# Patient Record
Sex: MALE | Race: WHITE
[De-identification: names, ages, dates, MRNs, and addresses within clinical notes are randomized per-mention and may not be internally consistent; named-entity substitution may affect disease eponyms.]

---

## 2017-12-07 ENCOUNTER — HOSPITAL ENCOUNTER (OUTPATIENT)
Dept: HOSPITAL 88 - PT | Age: 67
LOS: 24 days | End: 2017-12-31
Attending: ORTHOPAEDIC SURGERY
Payer: MEDICARE

## 2017-12-07 DIAGNOSIS — M16.11: ICD-10-CM

## 2017-12-07 DIAGNOSIS — Z96.641: Primary | ICD-10-CM

## 2017-12-07 DIAGNOSIS — M62.81: ICD-10-CM

## 2017-12-07 DIAGNOSIS — Z47.1: ICD-10-CM

## 2017-12-07 DIAGNOSIS — M25.651: ICD-10-CM

## 2017-12-07 PROCEDURE — 97110 THERAPEUTIC EXERCISES: CPT

## 2019-11-17 ENCOUNTER — HOSPITAL ENCOUNTER (OUTPATIENT)
Dept: HOSPITAL 88 - ER | Age: 69
Setting detail: OBSERVATION
LOS: 2 days | Discharge: HOME | End: 2019-11-19
Attending: INTERNAL MEDICINE | Admitting: INTERNAL MEDICINE
Payer: MEDICARE

## 2019-11-17 VITALS — BODY MASS INDEX: 24.67 KG/M2 | WEIGHT: 166.56 LBS | HEIGHT: 69 IN

## 2019-11-17 VITALS — SYSTOLIC BLOOD PRESSURE: 101 MMHG | DIASTOLIC BLOOD PRESSURE: 65 MMHG

## 2019-11-17 VITALS — SYSTOLIC BLOOD PRESSURE: 132 MMHG | DIASTOLIC BLOOD PRESSURE: 82 MMHG

## 2019-11-17 VITALS — DIASTOLIC BLOOD PRESSURE: 69 MMHG | SYSTOLIC BLOOD PRESSURE: 117 MMHG

## 2019-11-17 VITALS — DIASTOLIC BLOOD PRESSURE: 60 MMHG | SYSTOLIC BLOOD PRESSURE: 101 MMHG

## 2019-11-17 DIAGNOSIS — I10: ICD-10-CM

## 2019-11-17 DIAGNOSIS — E78.5: ICD-10-CM

## 2019-11-17 DIAGNOSIS — M54.2: ICD-10-CM

## 2019-11-17 DIAGNOSIS — Z82.49: ICD-10-CM

## 2019-11-17 DIAGNOSIS — R94.31: ICD-10-CM

## 2019-11-17 DIAGNOSIS — R07.2: Primary | ICD-10-CM

## 2019-11-17 DIAGNOSIS — K21.9: ICD-10-CM

## 2019-11-17 LAB
ALBUMIN SERPL-MCNC: 4 G/DL (ref 3.5–5)
ALBUMIN/GLOB SERPL: 1.7 {RATIO} (ref 0.8–2)
ALP SERPL-CCNC: 75 IU/L (ref 40–150)
ALT SERPL-CCNC: 25 IU/L (ref 0–55)
ANION GAP SERPL CALC-SCNC: 12.8 MMOL/L (ref 8–16)
BACTERIA URNS QL MICRO: (no result) /HPF
BASOPHILS # BLD AUTO: 0 10*3/UL (ref 0–0.1)
BASOPHILS NFR BLD AUTO: 0.6 % (ref 0–1)
BILIRUB UR QL: NEGATIVE
BUN SERPL-MCNC: 11 MG/DL (ref 7–26)
BUN/CREAT SERPL: 9 (ref 6–25)
CALCIUM SERPL-MCNC: 9.8 MG/DL (ref 8.4–10.2)
CHLORIDE SERPL-SCNC: 102 MMOL/L (ref 98–107)
CK MB SERPL-MCNC: 2 NG/ML (ref 0–5)
CK MB SERPL-MCNC: 2.4 NG/ML (ref 0–5)
CK MB SERPL-MCNC: 2.9 NG/ML (ref 0–5)
CK SERPL-CCNC: 110 IU/L (ref 30–200)
CK SERPL-CCNC: 81 IU/L (ref 30–200)
CK SERPL-CCNC: 91 IU/L (ref 30–200)
CLARITY UR: CLEAR
CO2 SERPL-SCNC: 28 MMOL/L (ref 22–29)
COLOR UR: YELLOW
DEPRECATED APTT PLAS QN: 27.3 SECONDS (ref 23.8–35.5)
DEPRECATED INR PLAS: 0.97
DEPRECATED NEUTROPHILS # BLD AUTO: 3.6 10*3/UL (ref 2.1–6.9)
DEPRECATED RBC URNS MANUAL-ACNC: (no result) /HPF (ref 0–5)
EGFRCR SERPLBLD CKD-EPI 2021: > 60 ML/MIN (ref 60–?)
EOSINOPHIL # BLD AUTO: 0.1 10*3/UL (ref 0–0.4)
EOSINOPHIL NFR BLD AUTO: 1.7 % (ref 0–6)
EPI CELLS URNS QL MICRO: (no result) /LPF
ERYTHROCYTE [DISTWIDTH] IN CORD BLOOD: 11.8 % (ref 11.7–14.4)
GLOBULIN PLAS-MCNC: 2.4 G/DL (ref 2.3–3.5)
GLUCOSE SERPLBLD-MCNC: 117 MG/DL (ref 74–118)
HCT VFR BLD AUTO: 46 % (ref 38.2–49.6)
HGB BLD-MCNC: 15.5 G/DL (ref 14–18)
KETONES UR QL STRIP.AUTO: NEGATIVE
LEUKOCYTE ESTERASE UR QL STRIP.AUTO: NEGATIVE
LYMPHOCYTES # BLD: 1.3 10*3/UL (ref 1–3.2)
LYMPHOCYTES NFR BLD AUTO: 23.7 % (ref 18–39.1)
MAGNESIUM SERPL-MCNC: 1.8 MG/DL (ref 1.3–2.1)
MCH RBC QN AUTO: 29.7 PG (ref 28–32)
MCHC RBC AUTO-ENTMCNC: 33.7 G/DL (ref 31–35)
MCV RBC AUTO: 88.1 FL (ref 81–99)
MONOCYTES # BLD AUTO: 0.4 10*3/UL (ref 0.2–0.8)
MONOCYTES NFR BLD AUTO: 7.4 % (ref 4.4–11.3)
NEUTS SEG NFR BLD AUTO: 66.4 % (ref 38.7–80)
NITRITE UR QL STRIP.AUTO: NEGATIVE
PLATELET # BLD AUTO: 221 X10E3/UL (ref 140–360)
POTASSIUM SERPL-SCNC: 3.8 MMOL/L (ref 3.5–5.1)
PROT UR QL STRIP.AUTO: NEGATIVE
PROTHROMBIN TIME: 13.4 SECONDS (ref 11.9–14.5)
RBC # BLD AUTO: 5.22 X10E6/UL (ref 4.3–5.7)
SODIUM SERPL-SCNC: 139 MMOL/L (ref 136–145)
SP GR UR STRIP: 1.01 (ref 1.01–1.02)
UROBILINOGEN UR STRIP-MCNC: 0.2 MG/DL (ref 0.2–1)
WBC #/AREA URNS HPF: (no result) /HPF (ref 0–5)

## 2019-11-17 PROCEDURE — 81001 URINALYSIS AUTO W/SCOPE: CPT

## 2019-11-17 PROCEDURE — 93017 CV STRESS TEST TRACING ONLY: CPT

## 2019-11-17 PROCEDURE — 82553 CREATINE MB FRACTION: CPT

## 2019-11-17 PROCEDURE — 83735 ASSAY OF MAGNESIUM: CPT

## 2019-11-17 PROCEDURE — 80061 LIPID PANEL: CPT

## 2019-11-17 PROCEDURE — 82550 ASSAY OF CK (CPK): CPT

## 2019-11-17 PROCEDURE — 36415 COLL VENOUS BLD VENIPUNCTURE: CPT

## 2019-11-17 PROCEDURE — 85025 COMPLETE CBC W/AUTO DIFF WBC: CPT

## 2019-11-17 PROCEDURE — 78452 HT MUSCLE IMAGE SPECT MULT: CPT

## 2019-11-17 PROCEDURE — 93005 ELECTROCARDIOGRAM TRACING: CPT

## 2019-11-17 PROCEDURE — 99284 EMERGENCY DEPT VISIT MOD MDM: CPT

## 2019-11-17 PROCEDURE — 85730 THROMBOPLASTIN TIME PARTIAL: CPT

## 2019-11-17 PROCEDURE — 84484 ASSAY OF TROPONIN QUANT: CPT

## 2019-11-17 PROCEDURE — 71045 X-RAY EXAM CHEST 1 VIEW: CPT

## 2019-11-17 PROCEDURE — 80053 COMPREHEN METABOLIC PANEL: CPT

## 2019-11-17 PROCEDURE — 85610 PROTHROMBIN TIME: CPT

## 2019-11-17 PROCEDURE — 83880 ASSAY OF NATRIURETIC PEPTIDE: CPT

## 2019-11-17 RX ADMIN — FAMOTIDINE SCH MG: 10 INJECTION, SOLUTION INTRAVENOUS at 21:08

## 2019-11-17 RX ADMIN — ASPIRIN SCH MG: 81 TABLET, COATED ORAL at 09:22

## 2019-11-17 RX ADMIN — FAMOTIDINE SCH MG: 10 INJECTION, SOLUTION INTRAVENOUS at 09:22

## 2019-11-17 NOTE — NUR
RECEIVED PT IN BED AOX3 .RESPIRATIONS ARE EVEN AND UNLABORED .DENIES PAIN /TELE SHOWS IST 
DEGREE BLOCK .CALL LIGHT WITH IN REACH  CONTINUE TO MONITOR

## 2019-11-17 NOTE — NUR
MD IN TO SEE PATIENT. DIET ADVANCED TO GI SOFT.

-------------------------------------------------------------------------------

Addendum: 11/17/19 at 1543 by Maritza Robles RN

-------------------------------------------------------------------------------

WRONG PATIENT.

## 2019-11-17 NOTE — XMS REPORT
Patient Summary Document

                             Created on: 2019



LENA TOMAS

External Reference #: [id number]

: 1950

Sex: Male



Demographics







                          Address                   [address] [place]  [address]

 

                          Home Phone                [phone]

 

                          Preferred Language        Unknown

 

                          Marital Status            Unknown

 

                          [employer] Affiliation     Unknown

 

                          Race                      Unknown

 

                          Ethnic Group              Unknown





Author







                          Author                    [facility]

 

                          [facility]

 

                          Address                   Unknown

 

                          Phone                     Unavailable







Support







                Name            Relationship    Address         Phone

 

                    JOAQUÍN HAYWOOD      PRS                 [employer]

[place], [place]  [address] [phone]

 

                    YOB[place]  [address]                      [phone]

 

                    LIVIERSANJANA HAYNESA      PRS                 N/A

[place]  [address]                      [phone]







Care Team Providers







                    Care Team Member Name    Role                Phone

 

                          Unavailable               Unavailable







Payers







             Payer Name    Policy Type    Policy Number    Effective Date    Expiration Date







Problems

This patient has no known problems.



Allergies, Adverse Reactions, Alerts







          Allergy Name    Allergy Type    Status    Severity    Reaction(s)    Onset Date    Inactive 

Date                      Treating Clinician        Comments

 

        No Known Allergies    DA      Active    U               2017 00:00:00                     







Medications

This patient has no known medications.

## 2019-11-17 NOTE — NUR
PATIENT ADMITTED FROM ER PER STRETCHER. ALERT AND VERBALLY RESPONSIVE. DENIED ANY PAIN AT 
THIS TIME. SKIN WARM AND DRY TO TOUCH, RESPIRATION EVEN AND UNLABORED, ABDOMEN SOFT AND NON 
DISTENDED. TELEMETRY BOX 7 IN PLACE. PATIENT ORIENTED TO SURROUNDINGS. BED IN LOWER 
POSITION, CALL LIGHT AT REACH, INSTRUCTED TO CALL FOR ASSISTANCE AS NEEDED.

## 2019-11-17 NOTE — DIAGNOSTIC IMAGING REPORT
Examination: Single AP view of the chest.



COMPARISON: Report for CT angiography of the chest 7/31/2017. No images

available.



INDICATION: Chest pain

     

DISCUSSION:



Lungs are well-inflated and without consolidation, pleural effusion, or

pneumothorax. Cardiomediastinal contour is notable for tortuosity of the

thoracic aorta. Normal heart size. No overt pulmonary edema.



No acute osseous abnormality.



IMPRESSION:

 

1.   No acute cardiopulmonary abnormalities.



Signed by: Dr. Chetan Hsu M.D. on 11/17/2019 9:00 AM

## 2019-11-18 VITALS — DIASTOLIC BLOOD PRESSURE: 64 MMHG | SYSTOLIC BLOOD PRESSURE: 107 MMHG

## 2019-11-18 VITALS — SYSTOLIC BLOOD PRESSURE: 120 MMHG | DIASTOLIC BLOOD PRESSURE: 78 MMHG

## 2019-11-18 VITALS — DIASTOLIC BLOOD PRESSURE: 59 MMHG | SYSTOLIC BLOOD PRESSURE: 119 MMHG

## 2019-11-18 VITALS — SYSTOLIC BLOOD PRESSURE: 114 MMHG | DIASTOLIC BLOOD PRESSURE: 60 MMHG

## 2019-11-18 VITALS — DIASTOLIC BLOOD PRESSURE: 74 MMHG | SYSTOLIC BLOOD PRESSURE: 112 MMHG

## 2019-11-18 VITALS — DIASTOLIC BLOOD PRESSURE: 82 MMHG | SYSTOLIC BLOOD PRESSURE: 130 MMHG

## 2019-11-18 LAB
ALBUMIN SERPL-MCNC: 3.5 G/DL (ref 3.5–5)
ALBUMIN/GLOB SERPL: 1.5 {RATIO} (ref 0.8–2)
ALP SERPL-CCNC: 66 IU/L (ref 40–150)
ALT SERPL-CCNC: 23 IU/L (ref 0–55)
ANION GAP SERPL CALC-SCNC: 10.1 MMOL/L (ref 8–16)
BASOPHILS # BLD AUTO: 0 10*3/UL (ref 0–0.1)
BASOPHILS NFR BLD AUTO: 0.7 % (ref 0–1)
BUN SERPL-MCNC: 12 MG/DL (ref 7–26)
BUN/CREAT SERPL: 12 (ref 6–25)
CALCIUM SERPL-MCNC: 9.5 MG/DL (ref 8.4–10.2)
CHLORIDE SERPL-SCNC: 104 MMOL/L (ref 98–107)
CHOLEST SERPL-MCNC: 126 MD/DL (ref 0–199)
CHOLEST/HDLC SERPL: 3.4 {RATIO} (ref 3.9–4.7)
CK MB SERPL-MCNC: 1.7 NG/ML (ref 0–5)
CK SERPL-CCNC: 78 IU/L (ref 30–200)
CO2 SERPL-SCNC: 28 MMOL/L (ref 22–29)
DEPRECATED NEUTROPHILS # BLD AUTO: 3.5 10*3/UL (ref 2.1–6.9)
EGFRCR SERPLBLD CKD-EPI 2021: > 60 ML/MIN (ref 60–?)
EOSINOPHIL # BLD AUTO: 0.1 10*3/UL (ref 0–0.4)
EOSINOPHIL NFR BLD AUTO: 2.3 % (ref 0–6)
ERYTHROCYTE [DISTWIDTH] IN CORD BLOOD: 11.9 % (ref 11.7–14.4)
GLOBULIN PLAS-MCNC: 2.3 G/DL (ref 2.3–3.5)
GLUCOSE SERPLBLD-MCNC: 94 MG/DL (ref 74–118)
HCT VFR BLD AUTO: 44.5 % (ref 38.2–49.6)
HDLC SERPL-MSCNC: 37 MG/DL (ref 40–60)
HGB BLD-MCNC: 14.8 G/DL (ref 14–18)
LDLC SERPL CALC-MCNC: 74 MG/DL (ref 60–130)
LYMPHOCYTES # BLD: 1.4 10*3/UL (ref 1–3.2)
LYMPHOCYTES NFR BLD AUTO: 25.6 % (ref 18–39.1)
MCH RBC QN AUTO: 29.2 PG (ref 28–32)
MCHC RBC AUTO-ENTMCNC: 33.3 G/DL (ref 31–35)
MCV RBC AUTO: 87.8 FL (ref 81–99)
MONOCYTES # BLD AUTO: 0.5 10*3/UL (ref 0.2–0.8)
MONOCYTES NFR BLD AUTO: 8.4 % (ref 4.4–11.3)
NEUTS SEG NFR BLD AUTO: 63 % (ref 38.7–80)
PLATELET # BLD AUTO: 185 X10E3/UL (ref 140–360)
POTASSIUM SERPL-SCNC: 4.1 MMOL/L (ref 3.5–5.1)
RBC # BLD AUTO: 5.07 X10E6/UL (ref 4.3–5.7)
SODIUM SERPL-SCNC: 138 MMOL/L (ref 136–145)
TRIGL SERPL-MCNC: 76 MG/DL (ref 0–149)

## 2019-11-18 RX ADMIN — ENALAPRIL MALEATE SCH MG: 10 TABLET ORAL at 09:00

## 2019-11-18 RX ADMIN — FAMOTIDINE SCH MG: 10 INJECTION, SOLUTION INTRAVENOUS at 08:10

## 2019-11-18 RX ADMIN — BICTEGRAVIR SODIUM, EMTRICITABINE, AND TENOFOVIR ALAFENAMIDE FUMARATE SCH: 50; 200; 25 TABLET ORAL at 09:00

## 2019-11-18 RX ADMIN — PANTOPRAZOLE SODIUM SCH MG: 40 TABLET, DELAYED RELEASE ORAL at 09:00

## 2019-11-18 NOTE — NUR
PT C/O CHEST PAIN CALLED DR DODSON AND GOT THE ORDER TO GIVE MORPHINE .GIVEN MORPHINE X1 .PT 
RESTING .PT NPO FOR STRESS TEST .CONTINUE TO MONITOR

## 2019-11-18 NOTE — HISTORY AND PHYSICAL
PRIMARY CARE PHYSICIAN:  Dr. Kali Mckeon.

 

CONSULTANT:  Dr. Angel Gay.

 

CHIEF COMPLAINT:  Chest pain.

 

HISTORY OF PRESENT ILLNESS:  The patient is a 69-year-old male, patient of Dr. Kali Mckeon.  The patient came to the hospital with complaint of atypical chest pain.  The

patient is supposed to have a stress test later on with Dr. Gay.  The patient is

stating that the chest pain described as burning, located to the left epigastric area.

There is no significant nausea or vomiting.  No diaphoresis.  The patient is stable.  He

is pending for a stress test today. 

 

PAST MEDICAL HISTORY:  Hypertension, dyslipidemia, reflux, gastritis. 

 

Hiatal hernia.

 

PAST SURGICAL HISTORY:  Colonoscopy, upper endoscopy and hip surgery. 

 

Abdominal hernia repair.

 

SOCIAL HISTORY:  The patient does not smoke or use alcohol.  No regular drugs.

 

ALLERGIES:  NO KNOWN ALLERGIES.

 

HOME MEDICATIONS:  List is reviewed.

 

REVIEW OF SYSTEMS:

Chest pain.  No headaches.  No nausea or vomiting.  No radiating pain.

 

PHYSICAL EXAMINATION:

VITAL SIGNS:  Temperature is 97, blood pressure 107/64, pulse rate 57, and respirations

18. 

GENERAL:  The patient is not in acute distress. 

HEENT:  Normocephalic and atraumatic.  Pupils reactive.  Anicteric. 

NECK:  Supple grossly. 

PULMONARY:  Clear. 

CARDIOVASCULAR:  Regular rate and rhythm. 

ABDOMEN:  Soft.  Unremarkable. 

EXTREMITIES:  No cyanosis or edema. 

NEUROLOGIC:  No gross focal deficit.

LABORATORY DATA:  Sodium is 138, potassium 4.1, chloride 104, bicarb 28, BUN 12,

creatinine 1.2, and glucose is 94. 

 

WBC is 5.6, hemoglobin 14.8, hematocrit 44.5, and platelets is 187. 

 

Chest x-ray otherwise unremarkable.

 

IMPRESSION:  Atypical chest pain.

 

PLAN:  Lexiscan stress test today.  Once the stress is negative, the patient should be

able to go home.  He will resume his home medication. 

 

 

 

 

______________________________

MD JAYCE Maria/ROSA

D:  11/18/2019 08:43:09

T:  11/18/2019 12:23:46

Job #:  [id number]

## 2019-11-18 NOTE — NUR
Attempted to call Dr. Escobar to report lexiscan stress test result but no answer. Awaiting on 
MD call back. Will pass on information to incoming dayshift RN.

## 2019-11-18 NOTE — NUR
Patient visited in room during nursing rounds. Patient alert and oriented x3. Ambulatory in 
room prn. Patient denies pain at this time. Awaiting on cardio (Dr. Green) results from 
stress test. Call bell within reach.

## 2019-11-18 NOTE — MYOVIEW STRESS TEST
DATE OF STUDY:  11/18/2019 09:09:00

 

Stress Test - Treadmill ONLY

 

PROCEDURE TITLE:  Rest/stress single isotope SPECT imaging pharmacologic stress and

gated SPECT imaging. 

 

INDICATION:  Chest pain.

 

PROCEDURE IN DETAIL:  Pharmacologic stress testing was performed with regadenoson per

protocol.  The heart rate was 60 beats per minute at rest and increased to 105 beats per

minute during the regadenoson infusion.  The rest blood pressure was 119/70 mmHg and

increased to 130/77 mmHg, which is a normal response.  The resting electrocardiogram

demonstrated normal sinus rhythm.  There were no ST-segment changes suggestive of

myocardial ischemia. 

 

Myocardial perfusion imaging was performed at rest following injection of 11 mCi of

tetrofosmin.  At peak pharmacologic effect, the patient was at 32 mCi of tetrofosmin.

Gated post-stress tomographic imaging was performed. 

 

FINDINGS:  The overall quality of study is fair.  Left ventricular cavity is normal size

on the rest and stress studies.  SPECT images demonstrate homogeneous tracer

distribution throughout the myocardium.  Gated SPECT imaging reveals normal myocardial

thickening and wall motion.  The left ventricular ejection fraction calculated to be

65%. 

 

IMPRESSION:  Myocardial perfusion imaging is normal.  Overall left ventricular systolic

function was normal without regional wall motion abnormalities. 

 

 

 

 

______________________________

Ericka Green MD

 

ABS/MODL

D:  11/18/2019 19:05:39

T:  11/18/2019 21:28:14

Job #:  [id number]

## 2019-11-18 NOTE — NUR
Received call from Dr. Green. MD stated lexiscan stress test was negative and pt cleared 
from cardiac standpoint.

## 2019-11-18 NOTE — NUR
Informed patient that stress test result was negative. Pt wishes to go home tomorrow. Will 
attempt to call Dr. Escobar to notify.

## 2019-11-18 NOTE — CONSULTATION
DATE OF CONSULTATION:  11/18/2019

 

Cardiology consultation 

 

REQUESTING PHYSICIAN:  Jesus Escobar MD.

 

REASON FOR CONSULTATION:  Chest pain.

 

HISTORY OF PRESENT ILLNESS:  This is a 69-year-old male with history of hypertension,

hyperlipidemia, who presents with complaints of chest pain.  He reports he has been

having chest pain for the last three or four days.  He describes it as a burning

sensation 4/10 in severity, lasting 45 minutes to 1 hour at a time when he lies down.

Denies any shortness of breath, nausea, diaphoresis.  Denies any edema, orthopnea or

PND. 

 

REVIEW OF SYSTEMS:

Negative except as per HPI.

 

PAST MEDICAL HISTORY:  

1. Hypertension.

2. Hyperlipidemia.

 

PAST SURGICAL HISTORY:  

1. Hernia repair.

2. Right hip replacement.

3. Toe surgery.

 

ALLERGIES:  PLEASE SEE EMR.

 

MEDICATIONS:  Please see medication list.

 

SOCIAL HISTORY:  Denies any tobacco or illicit drugs.  He does drink approximately a six

pack of beer a day. 

 

FAMILY HISTORY:  Pertinent for father and brother with coronary artery disease as well

as myocardial infarction. 

 

PHYSICAL EXAMINATION:

VITAL SIGNS: Temperature 96.4 degrees, pulse is 57, respiratory rate 18, blood pressure

107/64, and oxygen saturation 98% on room air. 

GENERAL:  Awake, alert, no acute distress. 

LUNGS:  Clear to auscultation bilaterally.  No wheezes or crackles. 

CARDIOVASCULAR:  Normal rate, regular rhythm.  No murmur.  Normal S1, S2. 

ABDOMEN:  Soft nontender. 

EXTREMITIES:  No edema. 

NEUROLOGIC:  Nonfocal exam.

LABORATORY DATA:  WBC 5.62, hemoglobin 14.8, hematocrit 44.5, platelets 185.  Sodium

138, potassium 4.1, chloride 104, CO2 of 28, BUN 12, creatinine 1, LDL 74. 

 

TELEMETRY:  Normal sinus rhythm.

 

ELECTROCARDIOGRAM:  EKG; normal sinus rhythm with sinus arrhythmia, T-wave abnormality,

anterior ischemia. 

 

IMPRESSION:  

1. Chest pain.

2. Hypertension.

3. Hyperlipidemia.

4. Abnormal EKG.

 

RECOMMENDATIONS:  The patient has ruled out for myocardial infarction with serial

cardiac biomarkers.  Obtain echo, nuclear stress test to evaluate for ischemia given his

risk factors.  Further recommendations pending test results. 

 

Thank you for this consult.  We will continue to follow.

 

 

 

 

______________________________

Ericka Green MD

 

ABS/MODL

D:  11/18/2019 17:21:22

T:  11/18/2019 18:30:59

Job #:  [id number]

## 2019-11-19 VITALS — DIASTOLIC BLOOD PRESSURE: 80 MMHG | SYSTOLIC BLOOD PRESSURE: 164 MMHG

## 2019-11-19 VITALS — DIASTOLIC BLOOD PRESSURE: 70 MMHG | SYSTOLIC BLOOD PRESSURE: 128 MMHG

## 2019-11-19 VITALS — SYSTOLIC BLOOD PRESSURE: 164 MMHG | DIASTOLIC BLOOD PRESSURE: 80 MMHG

## 2019-11-19 VITALS — DIASTOLIC BLOOD PRESSURE: 62 MMHG | SYSTOLIC BLOOD PRESSURE: 121 MMHG

## 2019-11-19 RX ADMIN — BICTEGRAVIR SODIUM, EMTRICITABINE, AND TENOFOVIR ALAFENAMIDE FUMARATE SCH: 50; 200; 25 TABLET ORAL at 09:00

## 2019-11-19 RX ADMIN — PANTOPRAZOLE SODIUM SCH MG: 40 TABLET, DELAYED RELEASE ORAL at 09:07

## 2019-11-19 RX ADMIN — ENALAPRIL MALEATE SCH MG: 10 TABLET ORAL at 09:07

## 2019-11-19 RX ADMIN — ASPIRIN SCH MG: 81 TABLET, COATED ORAL at 09:07

## 2019-11-19 NOTE — NUR
patient discharged home, Dr Escobar and Dr Green had rounds, prescription given, patient aware 
about his f/up appointments, IV canula removed with tip intact, no ss of infiltration, 
denies any pain, no distress noted, transported via wheelchair to [facility], he got his 
personnel car

## 2019-11-19 NOTE — PROGRESS NOTE
DATE:  11/19/2019

 

__________ 

 

SUBJECTIVE:  The patient denies chest pain or shortness of breath.  He complains of left

neck pain. 

 

OBJECTIVE:  VITAL SIGNS:  Temperature 97.9 degrees, pulse 82, respiratory rate 18, blood

164/80, and oxygen saturation 100% on room air. 

GENERAL:  Awake, alert, in no acute distress. 

LUNGS:  Clear to auscultation bilaterally.  No wheeze or crackles. 

CARDIOVASCULAR:  Normal rate, regular rhythm.  No murmur.  Normal S1 and S2. 

ABDOMEN:  Soft and nontender. 

EXTREMITIES:  No edema.

 

CARDIAC MEDICATIONS:  

1. Enalapril 20 mg p.o. daily.

2. Aspirin 81 mg p.o. daily.

3. Atorvastatin 20 mg p.o. at bedtime.

 

LABORATORY DATA:  None today.

 

TELEMETRY:  Normal sinus rhythm.

 

IMPRESSION:  

1. Chest pain.

2. Hypertension.

3. Hyperlipidemia.

4. Abnormal EKG.

 

RECOMMENDATIONS:  The patient ruled out for myocardial infarction with serial cardiac

biomarkers.  Nuclear stress test without evidence of ischemia.  No further cardiac

evaluation is indicated at this time.  Please have patient follow up with Dr. Gay in

two weeks. 

 

Thank you for this consult.  We will continue to follow.

 

 

 

 

______________________________

Ericka Green MD

 

ABS/MODL

D:  11/19/2019 17:54:58

T:  11/19/2019 18:46:24

Job #:  [id number]

## 2019-11-20 NOTE — DISCHARGE SUMMARY
PRIMARY CARE PHYSICIAN:  Kali Mckeon MD

 

CONSULTANT:  Dr. Angel Gay.

 

FINAL DIAGNOSES:  

1. Atypical chest pain with negative EKG, negative cardiac enzyme and negative nuclear

stress test. 

2. Left-sided neck pain, most likely musculoskeletal pain.

 

SUMMARY:  The patient is a 69-year-old male with baseline hypertension, dyslipidemia.

The patient came in with left-sided neck pain, chest pain.  The patient was scheduled

for outpatient stress test, but he was anxious; therefore, he came to the hospital.  He

was ruled out for any myocardial infarction and the patient subsequently had a nuclear

stress test that was negative yesterday.  He is concerned with his left-sided neck pain

and musculoskeletal pain, most likely with palpation and range of motion.  The patient

will go home with Mobic 7.5 mg one 1 to 2 

tablets each day as needed for pain.  The patient to follow up with his family physician

for management of his neck pain, most likely musculoskeletal pain on physical

examination. 

 

 

 

 

______________________________

MD JAYCE Maria/ROSA

D:  11/19/2019 09:00:21

T:  11/19/2019 14:55:20

Job #:  [id number]